# Patient Record
Sex: FEMALE | Race: WHITE | URBAN - METROPOLITAN AREA
[De-identification: names, ages, dates, MRNs, and addresses within clinical notes are randomized per-mention and may not be internally consistent; named-entity substitution may affect disease eponyms.]

---

## 2019-04-23 PROBLEM — N94.10 DYSPAREUNIA, FEMALE: Status: ACTIVE | Noted: 2017-10-07

## 2019-04-23 PROBLEM — G43.821 MENSTRUAL MIGRAINE WITH STATUS MIGRAINOSUS, NOT INTRACTABLE: Status: ACTIVE | Noted: 2018-04-17

## 2019-04-23 PROBLEM — G43.009 MIGRAINE WITHOUT AURA AND WITHOUT STATUS MIGRAINOSUS, NOT INTRACTABLE: Status: ACTIVE | Noted: 2017-02-22

## 2019-04-24 ENCOUNTER — OFFICE VISIT (OUTPATIENT)
Dept: NEUROLOGY | Facility: CLINIC | Age: 24
End: 2019-04-24
Payer: COMMERCIAL

## 2019-04-24 VITALS
HEART RATE: 78 BPM | OXYGEN SATURATION: 99 % | WEIGHT: 153 LBS | HEIGHT: 63 IN | BODY MASS INDEX: 27.11 KG/M2 | TEMPERATURE: 97.9 F | SYSTOLIC BLOOD PRESSURE: 113 MMHG | RESPIRATION RATE: 16 BRPM | DIASTOLIC BLOOD PRESSURE: 79 MMHG

## 2019-04-24 DIAGNOSIS — G43.009 MIGRAINE WITHOUT AURA AND WITHOUT STATUS MIGRAINOSUS, NOT INTRACTABLE: Primary | ICD-10-CM

## 2019-04-24 RX ORDER — SUMATRIPTAN 50 MG/1
25-50 TABLET, FILM COATED ORAL
COMMUNITY
Start: 2019-02-27

## 2019-04-24 RX ORDER — DROSPIRENONE AND ETHINYL ESTRADIOL 0.02-3(28)
KIT ORAL
COMMUNITY
Start: 2019-01-14

## 2019-04-24 SDOH — HEALTH STABILITY: MENTAL HEALTH: HOW OFTEN DO YOU HAVE A DRINK CONTAINING ALCOHOL?: MONTHLY OR LESS

## 2019-04-24 ASSESSMENT — MIFFLIN-ST. JEOR: SCORE: 1403.13

## 2019-04-24 ASSESSMENT — PAIN SCALES - GENERAL: PAINLEVEL: NO PAIN (0)

## 2019-04-24 NOTE — PATIENT INSTRUCTIONS
Plan:  Headache log for frequency, severity and possible triggers  Stay hydrated  Rest and find some way to relax and copying with stress  Take frequent breaks  Vitamin B2 (riboflavin) 400 mg daily OTC and magnesium 250-500 mg daily OTC for headache   Maximize your acute migraine treatment-may try sumatriptan 100 mg at onset of headache and repeat in 2 hours as needed. Max 200 mg in 24 hours. May take with naproxen 500 mg every 12 hours as needed for acute headache treatment  Limit use of acute medications use to no more than 8-10 days per months to prevent risk of rebound headaches   Look into alternative approaches-aromatherapy, acupuncture    Follow up 2- 3 months or sooner if needed if getting worse or any changes

## 2019-04-24 NOTE — NURSING NOTE
Chief Complaint   Patient presents with     Headache     [facility] NEW HEADACHE- MIGRAINE CONSULT       Jaquan Daley, EMT

## 2019-04-24 NOTE — PROGRESS NOTES
"Re: Anne Carver      MRN# [id number]  YOB: 1995  Date of Visit:4/24/2019     OUTPATIENT NEUROLOGY VISIT NOTE    Chief Complaint:  Headache evaluation    History of Present Illness  Anne Carver is a 24-year-old female presents to the clinic today for headache evaluation.  Accompanied by her BF Chalino    Headache History:    Onset History:  About 3 years when she was working on her Master's. Family history of headaches -may be mother    Current Headache Pattern:      Frequency (How many headache days per month?): twice per week for the past two weeks and before that about 1-2 times per week in the past 6 months. At least 4 headache days per months.      Duration of Headache: 1-3 days     Aura: none     Associated Symptoms:  light sensitivity, sound sensitivity, smell sensitivity, no nausea or vomiting, mental and physical fatigue, irritation       Description of Headache Pain & Location: Predominately  unilateral in the right frontal area or behind the eyes or whole frontal lobe but never left by itself      Level of Pain as headache is starting:  3-4/10      Level of Pain during the worst headache:  6-7/10      Do headaches interfere with or prevent usual activities or diminish your productivity at home or work?  Reports that she takes morning to \"take care of her migraine\" and has been affecting life in general    Treatments Tried:    Sumatriptan 50 mg once and may repeat it again and not always relieve 2 hours but it depends and feels that recovery from sumatriptan can be \"hearsh\"   Advil sometimes about 800 mg as needed  Have not tried naproxen    Have you needed to utilize the Emergency Room to treat your headache symptoms?  ED    Are Headaches worsening over time?  Stable to less than it was    What makes your headaches better?  Sleeping it off +sumatriptan    What makes your headaches worse or triggers your headaches? Alcohol heavily fermented causes throbbing, sugary food, light, smells, " menstrual cycle       Patient in PHD program in animal science here at the [facility]  On computer and other smart tech -practically all day long  On OCP and started non cycling birth control and reports that headaches are less after starting non-cycling OCP  Patient reports that tried to decrease her screen exposure,   Staying away from alcohol  Stress -work and school related  Worries about migraines  Sleeping more now and used to do sleep less  Hydration about 32 oz   Caffeine -at least one cup of coffee every day -espresso one per day or a strong cup of coffee an no other caffeine bevarage       Denies history of head or neck trauma, dizziness, vertigo, loss of consciousness, seizure, double vision, blurred vision, hearing difficulty, speech or swallowing difficulty, weakness or numbness in face, arms or legs, urinary or bowel incontinence, coordination problems or gait difficulty, fever or chills.    Neurodiagnostic Testing  CT head none  MRI brain none  Lab  none    Past Medical History reviewed and verified with the patient  Migraine/headaches    Past Surgical History reviewed and verified with the patient  none  Family History reviewed and verified with the patient  Mother-may be headaches    Social History:  In stable relationship, lives with her BF, no kids  Social History     Tobacco Use     Smoking status: Never Smoker     Smokeless tobacco: Never Used   Substance Use Topics     Alcohol use: Yes     Frequency: Monthly or less    reviewed and verified with the patient   No Known Allergies    Current Outpatient Medications   Medication Sig Dispense Refill     drospirenone-ethinyl estradiol (KAITLIN) 3-0.02 MG tablet Take one tablet continuously for three months       SUMAtriptan (IMITREX) 50 MG tablet Take 25-50 mg by mouth     reviewed and verified with the patient    Review of Systems:  A 12-point ROS including constitutional, eyes, ENT, respiratory, cardiovascular, gastroenterology, genitourinary, integumentary,  "musculoskeletal, neurology, hematology and psychiatric were all reviewed with the patient and completed at the [facility] Question nar and as mentioned in the HPI.     General Exam:   /79   Pulse 78   Temp 97.9  F (36.6  C) (Oral)   Resp 16   Ht 1.6 m (5' 3\")   Wt 69.4 kg (153 lb)   SpO2 99%   BMI 27.10 kg/m    GEN: Awake, NAD; good eye contact, responses appropriately, healthy appearing   HEENT: Head atraumatic/Normocephalic. Scalp normal. Pupils equally round, 4 mm, reactive to light and accommodation, sclera and conjunctiva normal. Fundoscopic examination reveals normal vessels no papilledema.   Neck: Easily moveable without resistance  Heart: S1/S2 appreciated, RRR, no m/r/g, no carotid bruits  Lungs:Lungs are clear to auscultation bilaterally, no wheezes or crackles.   Neurological Examination:  The patient is alert and oriented times four. Has good attention and concentration. Speech is fluent without dysarthria.   Cranial nerves:  CN I deferred.   CN II: Intact and full visual fields to confrontation bilaterally.   CN III, IV, VI: EOM intact. There is no nystagmus. Has conjugated gaze. Intact direct and consensual pupillary light reflexes.   CN V: Intact and symmetrical to facial sensation in the V1 through V3 bilaterally.   CN VII: Intact and symmetrical eyebrow and lid raise and eyelid closure, smiles and frown.   CN VIII: Intact to finger rub bilaterally.   CN IX and X: The palates elevates symmetrical. The uvula is midline.   CN XII: The tongue protrudes midline with no atrophy or fasciculations.   Motor exam: The patient has a normal bulk and tone throughout. There is no atrophy, fasciculations, clonus, or abnormal movements appreciated.   Strength Exam:  5/5 strength at shoulder abduction, elbow flexion or extension, wrist flexion or extension, finger abduction, , hip flexion and extension, knee flexion and extension, and dorsiflexion and plantarflexion bilaterally. "   Sensation is intact to light touch  throughout. Reflexes are 2+ and symmetrical at biceps, triceps, brachioradialis, patellar, and Achilles.   Negative Babinski with downgoing toes bilaterally.   Coordination reveals finger-nose-finger with normal speed and accuracy.   Station and gait is normal. There is no ataxia. Can walk on the toes, heels, and tandem walk without difficulty. Has no drift and a negative Romberg. Lhalexandrue's negative        Assessment and Plan:  Episodic Headaches reports twice per week for the past two weeks and before that about 1-2 times per week in the past 6 months. At least 4 headache days per months. Patient's reported headache symptoms most likely consistent with migraine phenotype. Non focal neurological exam. Discussed headache treatment goals with maximizing her acute treatment and preventive treatment with main emphasis on stress reduction and alternative treatment including but not limited to acupuncture, aromatherapy, etc.     Plan discussed with the patient:  Headache log for frequency, severity and possible triggers  Stay hydrated  Rest and find some way of copying with stress  Take frequent breaks  Vitamin B2 (riboflavin) 400 mg daily OTC and magnesium 250-500 mg daily OTC for headache   Maximize your acute migraine treatment-may try sumatriptan 100 mg at onset of headache and repeat in 2 hours as needed. Max 200 mg in 24 hours. May take with naproxen 500 mg every 12 hours as needed for acute headache treatment  Limit use of acute medications use to no more than 8-10 days per months to prevent risk of rebound headaches   Look into alternative approaches-aromatherapy, acupuncture    Follow up 2- 3 months or sooner if needed if getting worse or any changes      I discussed all my recommendation with Anne Carver. The patient verbalizes understanding and comfortable with the plan. The patient has our clinic phone number to call with any questions or concerns. All of the  patient's questions were answered from the best of my current knowledge.     Thank you for letting me be a part of the treatment team for Anne aCrver      Time spent with pt answering questions, discussing findings, counseling and coordinating care was more than 50% the appointment time, 60  minutes.         PATRICK Coyle, [facility]